# Patient Record
Sex: FEMALE | Race: BLACK OR AFRICAN AMERICAN | NOT HISPANIC OR LATINO | URBAN - METROPOLITAN AREA
[De-identification: names, ages, dates, MRNs, and addresses within clinical notes are randomized per-mention and may not be internally consistent; named-entity substitution may affect disease eponyms.]

---

## 2020-06-19 ENCOUNTER — OFFICE VISIT (OUTPATIENT)
Dept: URBAN - METROPOLITAN AREA CLINIC 109 | Facility: CLINIC | Age: 45
End: 2020-06-19

## 2021-04-15 ENCOUNTER — WEB ENCOUNTER (OUTPATIENT)
Dept: URBAN - METROPOLITAN AREA CLINIC 109 | Facility: CLINIC | Age: 46
End: 2021-04-15

## 2021-04-19 ENCOUNTER — WEB ENCOUNTER (OUTPATIENT)
Dept: URBAN - METROPOLITAN AREA CLINIC 109 | Facility: CLINIC | Age: 46
End: 2021-04-19

## 2021-10-20 ENCOUNTER — OFFICE VISIT (OUTPATIENT)
Dept: URBAN - METROPOLITAN AREA SURGERY CENTER 23 | Facility: SURGERY CENTER | Age: 46
End: 2021-10-20
Payer: COMMERCIAL

## 2021-10-20 DIAGNOSIS — Z12.11 AVERAGE RISK FOR CRC. DUE TO PT'S CO-MORBID STATE WITH END STAGE DEMENTIA, HIGH RISK FOR ANESTHESIA (PER NEUROLOGY); INABILITY TO TAKE A BOWEL PREP....WOULD NOT ADVISE ANY COLORECTAL CANCER SCREENING INCLUDING STOOL TEST FOR FECAL BLOOD.: ICD-10-CM

## 2021-10-20 PROCEDURE — G8907 PT DOC NO EVENTS ON DISCHARG: HCPCS | Performed by: INTERNAL MEDICINE

## 2021-10-20 PROCEDURE — G0121 COLON CA SCRN NOT HI RSK IND: HCPCS | Performed by: INTERNAL MEDICINE

## 2021-10-20 RX ORDER — HYDROXYZINE PAMOATE 50 MG/1
AS NEEDED CAPSULE ORAL
Qty: 0 | Refills: 0 | Status: ACTIVE | COMMUNITY
Start: 1900-01-01 | End: 1900-01-01

## 2021-10-20 RX ORDER — MONTELUKAST SODIUM 10 MG/1
TAKE 1 TABLET (10 MG) BY ORAL ROUTE ONCE DAILY IN THE EVENING TABLET, FILM COATED ORAL 1
Qty: 0 | Refills: 0 | Status: ACTIVE | COMMUNITY
Start: 1900-01-01 | End: 1900-01-01

## 2021-10-20 RX ORDER — ACETAMINOPHEN, CHLORPHENIRAMINE MALEATE, AND PHENYLEPHRINE HCL 325; 4; 10 MG/1; MG/1; MG/1
TABLET, MULTILAYER ORAL
Qty: 0 | Refills: 0 | Status: ACTIVE | COMMUNITY
Start: 1900-01-01 | End: 1900-01-01

## 2021-10-20 RX ORDER — AZELASTINE HYDROCHLORIDE AND FLUTICASONE PROPIONATE 137; 50 UG/1; UG/1
SPRAY 1 SPRAY IN EACH NOSTRIL BY INTRANASAL ROUTE 2 TIMES PER DAY SPRAY, METERED NASAL 2
Qty: 1 | Refills: 0 | Status: ACTIVE | COMMUNITY
Start: 1900-01-01 | End: 1900-01-01

## 2021-10-20 RX ORDER — AMLODIPINE BESYLATE 2.5 MG/1
TAKE 1 TABLET (2.5 MG) BY ORAL ROUTE ONCE DAILY TABLET ORAL 1
Qty: 0 | Refills: 0 | Status: ACTIVE | COMMUNITY
Start: 1900-01-01 | End: 1900-01-01

## 2021-10-20 RX ORDER — FAMOTIDINE 40 MG/1
TAKE 1 TABLET (40 MG) BY ORAL ROUTE ONCE DAILY AT BEDTIME TABLET ORAL 1
Qty: 0 | Refills: 0 | Status: ACTIVE | COMMUNITY
Start: 1900-01-01 | End: 1900-01-01

## 2022-09-13 ENCOUNTER — WEB ENCOUNTER (OUTPATIENT)
Dept: URBAN - METROPOLITAN AREA CLINIC 109 | Facility: CLINIC | Age: 47
End: 2022-09-13

## 2022-09-13 ENCOUNTER — OFFICE VISIT (OUTPATIENT)
Dept: URBAN - METROPOLITAN AREA CLINIC 109 | Facility: CLINIC | Age: 47
End: 2022-09-13
Payer: COMMERCIAL

## 2022-09-13 VITALS
TEMPERATURE: 97 F | WEIGHT: 136.6 LBS | HEART RATE: 78 BPM | SYSTOLIC BLOOD PRESSURE: 129 MMHG | HEIGHT: 68 IN | BODY MASS INDEX: 20.7 KG/M2 | DIASTOLIC BLOOD PRESSURE: 88 MMHG

## 2022-09-13 DIAGNOSIS — K21.9 GASTROESOPHAGEAL REFLUX DISEASE, UNSPECIFIED WHETHER ESOPHAGITIS PRESENT: ICD-10-CM

## 2022-09-13 PROBLEM — 235595009: Status: ACTIVE | Noted: 2022-09-13

## 2022-09-13 PROCEDURE — 99213 OFFICE O/P EST LOW 20 MIN: CPT | Performed by: INTERNAL MEDICINE

## 2022-09-13 RX ORDER — AZELASTINE HYDROCHLORIDE AND FLUTICASONE PROPIONATE 137; 50 UG/1; UG/1
SPRAY 1 SPRAY IN EACH NOSTRIL BY INTRANASAL ROUTE 2 TIMES PER DAY SPRAY, METERED NASAL 2
Qty: 1 | Refills: 0 | Status: ON HOLD | COMMUNITY
Start: 1900-01-01

## 2022-09-13 RX ORDER — OMEPRAZOLE 40 MG/1
1 CAPSULE CAPSULE, DELAYED RELEASE ORAL ONCE A DAY
Qty: 90 CAPSULE | Refills: 3

## 2022-09-13 RX ORDER — AMLODIPINE BESYLATE 2.5 MG/1
TAKE 1 TABLET (2.5 MG) BY ORAL ROUTE ONCE DAILY TABLET ORAL 1
Qty: 0 | Refills: 0 | Status: ACTIVE | COMMUNITY
Start: 1900-01-01

## 2022-09-13 RX ORDER — ACETAMINOPHEN, CHLORPHENIRAMINE MALEATE, AND PHENYLEPHRINE HCL 325; 4; 10 MG/1; MG/1; MG/1
TABLET, MULTILAYER ORAL
Qty: 0 | Refills: 0 | Status: ON HOLD | COMMUNITY
Start: 1900-01-01

## 2022-09-13 RX ORDER — OMEPRAZOLE 40 MG/1
1 CAPSULE 30 MINUTES BEFORE MORNING MEAL CAPSULE, DELAYED RELEASE ORAL ONCE A DAY
Status: ACTIVE | COMMUNITY

## 2022-09-13 RX ORDER — HYDROXYZINE PAMOATE 50 MG/1
AS NEEDED CAPSULE ORAL
Qty: 0 | Refills: 0 | Status: ON HOLD | COMMUNITY
Start: 1900-01-01

## 2022-09-13 RX ORDER — FAMOTIDINE 40 MG/1
TAKE 1 TABLET (40 MG) BY ORAL ROUTE ONCE DAILY AT BEDTIME TABLET ORAL 1
Qty: 0 | Refills: 0 | Status: ON HOLD | COMMUNITY
Start: 1900-01-01

## 2022-09-13 RX ORDER — MONTELUKAST SODIUM 10 MG/1
TAKE 1 TABLET (10 MG) BY ORAL ROUTE ONCE DAILY IN THE EVENING TABLET, FILM COATED ORAL 1
Qty: 0 | Refills: 0 | Status: ON HOLD | COMMUNITY
Start: 1900-01-01

## 2022-09-13 NOTE — HPI-TODAY'S VISIT:
The patient returns for f/u care of GERD with hx globus sensations. EGD in 2018 was unremarkable except for bile reflux. Currently taking famotidine 40mg prn and alternated with taking  omeprazole  40mg prn. Reflux sx are intermittent.   Colonoscopy in 2021 was normal. F/u in 10 years advised.  PMH multiple sclerosis- in remission, HTN

## 2023-03-13 ENCOUNTER — LAB OUTSIDE AN ENCOUNTER (OUTPATIENT)
Dept: URBAN - METROPOLITAN AREA CLINIC 109 | Facility: CLINIC | Age: 48
End: 2023-03-13

## 2023-03-13 ENCOUNTER — OFFICE VISIT (OUTPATIENT)
Dept: URBAN - METROPOLITAN AREA CLINIC 109 | Facility: CLINIC | Age: 48
End: 2023-03-13
Payer: COMMERCIAL

## 2023-03-13 VITALS
TEMPERATURE: 97.2 F | BODY MASS INDEX: 20.92 KG/M2 | SYSTOLIC BLOOD PRESSURE: 108 MMHG | HEART RATE: 79 BPM | HEIGHT: 68 IN | DIASTOLIC BLOOD PRESSURE: 70 MMHG | WEIGHT: 138 LBS

## 2023-03-13 DIAGNOSIS — K21.9 GASTROESOPHAGEAL REFLUX DISEASE, UNSPECIFIED WHETHER ESOPHAGITIS PRESENT: ICD-10-CM

## 2023-03-13 PROCEDURE — 99213 OFFICE O/P EST LOW 20 MIN: CPT | Performed by: INTERNAL MEDICINE

## 2023-03-13 RX ORDER — AMLODIPINE BESYLATE 2.5 MG/1
TAKE 1 TABLET (2.5 MG) BY ORAL ROUTE ONCE DAILY TABLET ORAL 1
Qty: 0 | Refills: 0 | Status: ACTIVE | COMMUNITY
Start: 1900-01-01

## 2023-03-13 RX ORDER — ACETAMINOPHEN, CHLORPHENIRAMINE MALEATE, AND PHENYLEPHRINE HCL 325; 4; 10 MG/1; MG/1; MG/1
TABLET, MULTILAYER ORAL
Qty: 0 | Refills: 0 | Status: ACTIVE | COMMUNITY
Start: 1900-01-01

## 2023-03-13 RX ORDER — HYDROXYZINE PAMOATE 50 MG/1
AS NEEDED CAPSULE ORAL
Qty: 0 | Refills: 0 | Status: ACTIVE | COMMUNITY
Start: 1900-01-01

## 2023-03-13 RX ORDER — MONTELUKAST SODIUM 10 MG/1
TAKE 1 TABLET (10 MG) BY ORAL ROUTE ONCE DAILY IN THE EVENING TABLET, FILM COATED ORAL 1
Qty: 0 | Refills: 0 | Status: ACTIVE | COMMUNITY
Start: 1900-01-01

## 2023-03-13 RX ORDER — OMEPRAZOLE 40 MG/1
1 CAPSULE CAPSULE, DELAYED RELEASE ORAL ONCE A DAY
Qty: 90 CAPSULE | Refills: 3 | Status: ACTIVE | COMMUNITY

## 2023-03-13 RX ORDER — FAMOTIDINE 40 MG/1
TAKE 1 TABLET (40 MG) BY ORAL ROUTE ONCE DAILY AT BEDTIME TABLET ORAL 1
Qty: 0 | Refills: 0 | Status: ACTIVE | COMMUNITY
Start: 1900-01-01

## 2023-03-13 RX ORDER — OMEPRAZOLE 40 MG/1
1 CAPSULE CAPSULE, DELAYED RELEASE ORAL ONCE A DAY
Qty: 90 CAPSULE | Refills: 3

## 2023-03-13 RX ORDER — AZELASTINE HYDROCHLORIDE AND FLUTICASONE PROPIONATE 137; 50 UG/1; UG/1
SPRAY 1 SPRAY IN EACH NOSTRIL BY INTRANASAL ROUTE 2 TIMES PER DAY SPRAY, METERED NASAL 2
Qty: 1 | Refills: 0 | Status: ACTIVE | COMMUNITY
Start: 1900-01-01

## 2023-03-13 NOTE — HPI-TODAY'S VISIT:
The patient returns for f/u visit. She has been feeling well overall and GERD sx have been stable. 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  From 9/13/22 visit:  The patient returns for f/u care of GERD with hx globus sensations. EGD in 2018 was unremarkable except for bile reflux. Currently taking famotidine 40mg prn and alternated with taking  omeprazole  40mg prn. Reflux sx are intermittent.   Colonoscopy in 2021 was normal. F/u in 10 years advised.  PMH multiple sclerosis- in remission, HTN

## 2023-03-21 ENCOUNTER — TELEPHONE ENCOUNTER (OUTPATIENT)
Dept: URBAN - METROPOLITAN AREA CLINIC 109 | Facility: CLINIC | Age: 48
End: 2023-03-21

## 2023-09-18 ENCOUNTER — WEB ENCOUNTER (OUTPATIENT)
Dept: URBAN - METROPOLITAN AREA CLINIC 109 | Facility: CLINIC | Age: 48
End: 2023-09-18

## 2023-09-18 ENCOUNTER — OFFICE VISIT (OUTPATIENT)
Dept: URBAN - METROPOLITAN AREA CLINIC 109 | Facility: CLINIC | Age: 48
End: 2023-09-18
Payer: COMMERCIAL

## 2023-09-18 VITALS
HEART RATE: 82 BPM | HEIGHT: 68 IN | BODY MASS INDEX: 20.73 KG/M2 | DIASTOLIC BLOOD PRESSURE: 90 MMHG | TEMPERATURE: 97.3 F | SYSTOLIC BLOOD PRESSURE: 136 MMHG | WEIGHT: 136.8 LBS

## 2023-09-18 DIAGNOSIS — K21.9 GASTROESOPHAGEAL REFLUX DISEASE, UNSPECIFIED WHETHER ESOPHAGITIS PRESENT: ICD-10-CM

## 2023-09-18 PROCEDURE — 99212 OFFICE O/P EST SF 10 MIN: CPT | Performed by: INTERNAL MEDICINE

## 2023-09-18 RX ORDER — HYDROXYZINE PAMOATE 50 MG/1
AS NEEDED CAPSULE ORAL
Qty: 0 | Refills: 0 | Status: ACTIVE | COMMUNITY
Start: 1900-01-01

## 2023-09-18 RX ORDER — FAMOTIDINE 40 MG/1
TAKE 1 TABLET (40 MG) BY ORAL ROUTE ONCE DAILY AT BEDTIME TABLET ORAL 1
Qty: 0 | Refills: 0 | Status: ACTIVE | COMMUNITY
Start: 1900-01-01

## 2023-09-18 RX ORDER — OMEPRAZOLE 40 MG/1
1 CAPSULE CAPSULE, DELAYED RELEASE ORAL
Qty: 90 CAPSULE | Refills: 2

## 2023-09-18 RX ORDER — ACETAMINOPHEN, CHLORPHENIRAMINE MALEATE, AND PHENYLEPHRINE HCL 325; 4; 10 MG/1; MG/1; MG/1
TABLET, MULTILAYER ORAL
Qty: 0 | Refills: 0 | Status: ACTIVE | COMMUNITY
Start: 1900-01-01

## 2023-09-18 RX ORDER — MONTELUKAST SODIUM 10 MG/1
TAKE 1 TABLET (10 MG) BY ORAL ROUTE ONCE DAILY IN THE EVENING TABLET, FILM COATED ORAL 1
Qty: 0 | Refills: 0 | Status: ACTIVE | COMMUNITY
Start: 1900-01-01

## 2023-09-18 RX ORDER — OMEPRAZOLE 40 MG/1
1 CAPSULE CAPSULE, DELAYED RELEASE ORAL ONCE A DAY
Qty: 90 CAPSULE | Refills: 3 | Status: ACTIVE | COMMUNITY

## 2023-09-18 RX ORDER — AZELASTINE HYDROCHLORIDE AND FLUTICASONE PROPIONATE 137; 50 UG/1; UG/1
SPRAY 1 SPRAY IN EACH NOSTRIL BY INTRANASAL ROUTE 2 TIMES PER DAY SPRAY, METERED NASAL 2
Qty: 1 | Refills: 0 | Status: ACTIVE | COMMUNITY
Start: 1900-01-01

## 2023-09-18 RX ORDER — AMLODIPINE BESYLATE 2.5 MG/1
TAKE 1 TABLET (2.5 MG) BY ORAL ROUTE ONCE DAILY TABLET ORAL 1
Qty: 0 | Refills: 0 | Status: ACTIVE | COMMUNITY
Start: 1900-01-01

## 2023-09-18 NOTE — HPI-TODAY'S VISIT:
The patient returns for hx GERD. Currently,  doing fairly well.  Takes alternating omeprazole or famotidine. Reflux is stable. Had Covid in July.  Has MS and beta thallassemia.  From 3/13/23 visit: The patient returns for f/u visit. She has been feeling well overall and GERD sx have been stable. 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  From 9/13/22 visit:  The patient returns for f/u care of GERD with hx globus sensations. EGD in 2018 was unremarkable except for bile reflux. Currently taking famotidine 40mg prn and alternated with taking  omeprazole  40mg prn. Reflux sx are intermittent.   Colonoscopy in 2021 was normal. F/u in 10 years advised.  PMH multiple sclerosis- in remission, HTN

## 2024-03-20 ENCOUNTER — OV EP (OUTPATIENT)
Dept: URBAN - METROPOLITAN AREA CLINIC 109 | Facility: CLINIC | Age: 49
End: 2024-03-20
Payer: COMMERCIAL

## 2024-03-20 VITALS
HEIGHT: 68 IN | HEART RATE: 84 BPM | DIASTOLIC BLOOD PRESSURE: 88 MMHG | BODY MASS INDEX: 21.31 KG/M2 | TEMPERATURE: 97.3 F | WEIGHT: 140.6 LBS | SYSTOLIC BLOOD PRESSURE: 136 MMHG

## 2024-03-20 DIAGNOSIS — R14.0 ABDOMINAL BLOATING: ICD-10-CM

## 2024-03-20 DIAGNOSIS — K21.9 GASTROESOPHAGEAL REFLUX DISEASE, UNSPECIFIED WHETHER ESOPHAGITIS PRESENT: ICD-10-CM

## 2024-03-20 PROBLEM — 116289008: Status: ACTIVE | Noted: 2024-03-20

## 2024-03-20 PROCEDURE — 99214 OFFICE O/P EST MOD 30 MIN: CPT | Performed by: INTERNAL MEDICINE

## 2024-03-20 RX ORDER — ACETAMINOPHEN, CHLORPHENIRAMINE MALEATE, AND PHENYLEPHRINE HCL 325; 4; 10 MG/1; MG/1; MG/1
TABLET, MULTILAYER ORAL
Qty: 0 | Refills: 0 | COMMUNITY
Start: 1900-01-01

## 2024-03-20 RX ORDER — HYDROXYZINE PAMOATE 50 MG/1
AS NEEDED CAPSULE ORAL
Qty: 0 | Refills: 0 | COMMUNITY
Start: 1900-01-01

## 2024-03-20 RX ORDER — AMLODIPINE BESYLATE 2.5 MG/1
TAKE 1 TABLET (2.5 MG) BY ORAL ROUTE ONCE DAILY TABLET ORAL 1
Qty: 0 | Refills: 0 | COMMUNITY
Start: 1900-01-01

## 2024-03-20 RX ORDER — OMEPRAZOLE 40 MG/1
1 CAPSULE CAPSULE, DELAYED RELEASE ORAL
Qty: 90 CAPSULE | Refills: 2 | COMMUNITY

## 2024-03-20 RX ORDER — MONTELUKAST SODIUM 10 MG/1
TAKE 1 TABLET (10 MG) BY ORAL ROUTE ONCE DAILY IN THE EVENING TABLET, FILM COATED ORAL 1
Qty: 0 | Refills: 0 | COMMUNITY
Start: 1900-01-01

## 2024-03-20 RX ORDER — OMEPRAZOLE 40 MG/1
1 CAPSULE CAPSULE, DELAYED RELEASE ORAL
Qty: 90 CAPSULE | Refills: 2

## 2024-03-20 RX ORDER — AZELASTINE HYDROCHLORIDE AND FLUTICASONE PROPIONATE 137; 50 UG/1; UG/1
SPRAY 1 SPRAY IN EACH NOSTRIL BY INTRANASAL ROUTE 2 TIMES PER DAY SPRAY, METERED NASAL 2
Qty: 1 | Refills: 0 | COMMUNITY
Start: 1900-01-01

## 2024-03-20 RX ORDER — FAMOTIDINE 40 MG/1
TAKE 1 TABLET (40 MG) BY ORAL ROUTE ONCE DAILY AT BEDTIME TABLET ORAL 1
Qty: 0 | Refills: 0 | COMMUNITY
Start: 1900-01-01

## 2024-03-20 NOTE — HPI-TODAY'S VISIT:
The patient presents for f/u appt.  previously seen by Dr Corona.  H/o chronic gerd, on PPI, takes prn which helps sx's.  denies dysphagia, n/v, weight loss, etc.  has occasional abd discomfort that improves following bm's.  denies gi bleeding.  up to date on colonoscopy.

## 2024-09-10 ENCOUNTER — DASHBOARD ENCOUNTERS (OUTPATIENT)
Age: 49
End: 2024-09-10

## 2024-09-18 ENCOUNTER — OFFICE VISIT (OUTPATIENT)
Dept: URBAN - METROPOLITAN AREA CLINIC 109 | Facility: CLINIC | Age: 49
End: 2024-09-18